# Patient Record
Sex: MALE | Race: OTHER | HISPANIC OR LATINO | URBAN - METROPOLITAN AREA
[De-identification: names, ages, dates, MRNs, and addresses within clinical notes are randomized per-mention and may not be internally consistent; named-entity substitution may affect disease eponyms.]

---

## 2023-02-08 ENCOUNTER — EMERGENCY (EMERGENCY)
Facility: HOSPITAL | Age: 28
LOS: 1 days | Discharge: HOME | End: 2023-02-08
Admitting: EMERGENCY MEDICINE
Payer: MEDICAID

## 2023-02-08 VITALS
HEART RATE: 96 BPM | OXYGEN SATURATION: 98 % | TEMPERATURE: 100 F | RESPIRATION RATE: 16 BRPM | SYSTOLIC BLOOD PRESSURE: 130 MMHG | DIASTOLIC BLOOD PRESSURE: 79 MMHG | WEIGHT: 199.96 LBS

## 2023-02-08 PROCEDURE — 99284 EMERGENCY DEPT VISIT MOD MDM: CPT

## 2023-02-08 RX ORDER — CEPHALEXIN 500 MG
500 CAPSULE ORAL ONCE
Refills: 0 | Status: COMPLETED | OUTPATIENT
Start: 2023-02-08 | End: 2023-02-08

## 2023-02-08 RX ORDER — IBUPROFEN 200 MG
600 TABLET ORAL ONCE
Refills: 0 | Status: COMPLETED | OUTPATIENT
Start: 2023-02-08 | End: 2023-02-08

## 2023-02-08 RX ORDER — CEPHALEXIN 500 MG
1 CAPSULE ORAL
Qty: 56 | Refills: 0
Start: 2023-02-08 | End: 2023-02-21

## 2023-02-08 RX ORDER — IBUPROFEN 200 MG
1 TABLET ORAL
Qty: 56 | Refills: 0
Start: 2023-02-08 | End: 2023-02-21

## 2023-02-08 RX ADMIN — Medication 500 MILLIGRAM(S): at 01:47

## 2023-02-08 RX ADMIN — Medication 600 MILLIGRAM(S): at 01:48

## 2023-02-08 RX ADMIN — Medication 1 TABLET(S): at 01:47

## 2023-02-08 NOTE — ED PROVIDER NOTE - CLINICAL SUMMARY MEDICAL DECISION MAKING FREE TEXT BOX
pt here with cellulitis of R buttock w/o underlying abscess with induration and ttp along the R buttock, POCUS + cellulitis w/o drainable collection, pt requested 2 wk of abx attempted to reinforce no need for 2 wk, but is insistent, will send request abx therapy but advised pt to only take 7 d return if symptoms return, plan: bactrim kefflex, nsaids/percocet

## 2023-02-08 NOTE — ED PROVIDER NOTE - NS ED ROS FT
Review of Systems    Constitutional: (+) fever  Integumentary: (-) rash, (-) edema  Neurological: (-) headache, (-) altered mental status  Heme/Lymph: (-) easy bruising (-) easy bleeding

## 2023-02-08 NOTE — ED PROVIDER NOTE - PHYSICAL EXAMINATION
Physical Exam    Vital Signs: I have reviewed the initial vital signs.  Constitutional: well-nourished, appears stated age  Gastrointestinal: soft, non-tender abdomen, no pulsatile mass, no rectal ttp, no perineal ttp or crepitus   Musculoskeletal: +R buttock ttp with induration w/o fluctuance and pimple, POCUS cobblestoning w/o any drainable fluid collection  Integumentary: warm, dry, no rash

## 2023-02-08 NOTE — ED PROVIDER NOTE - PATIENT PORTAL LINK FT
You can access the FollowMyHealth Patient Portal offered by [facility] by registering at the following website: [email or web address]. By joining [employer]’s FollowMyHealth portal, you will also be able to view your health information using other applications (apps) compatible with our system.

## 2023-02-08 NOTE — ED PROVIDER NOTE - OBJECTIVE STATEMENT
28 yo m pw R buttock pain aching mod in severity non radiating ongoing for 2 d in duration with subjective fevers and chills, pain with palpation at the site. No rectal pain or discharge.    I have reviewed available current nursing and previous documentation of past medical, surgical, family, and/or social history. negative...

## 2023-02-09 DIAGNOSIS — L03.317 CELLULITIS OF BUTTOCK: ICD-10-CM

## 2023-02-09 DIAGNOSIS — R50.9 FEVER, UNSPECIFIED: ICD-10-CM

## 2025-06-03 NOTE — ED ADULT NURSE NOTE - TEMPLATE
Health Maintenance       Shingles Vaccine (1 of 2)  Overdue since 10/13/2014    Microalbumin Ratio (Yearly)  Overdue since 7/11/2023    COVID-19 Vaccine (4 - 2024-25 season)  Overdue since 9/1/2024    Respiratory Syncytial Virus (RSV) Vaccine 60+ (1 - 1-dose 75+ series)  Never done    Traditional Medicare- Medicare Wellness Visit (Yearly)  Scheduled for 9/26/2025           Following review of the above:  Patient is not proceeding with: COVID-19, Respiratory Syncytial Virus (RSV), and Shingles    Patient is here with wife, Hali Eisenberg, and daughter, Lili.     Note: Refer to final orders and clinician documentation.       Wound Check/Suture Removal